# Patient Record
Sex: MALE | Race: WHITE | NOT HISPANIC OR LATINO | Employment: FULL TIME | URBAN - METROPOLITAN AREA
[De-identification: names, ages, dates, MRNs, and addresses within clinical notes are randomized per-mention and may not be internally consistent; named-entity substitution may affect disease eponyms.]

---

## 2020-04-14 ENCOUNTER — TELEPHONE (OUTPATIENT)
Dept: UROLOGY | Facility: CLINIC | Age: 45
End: 2020-04-14

## 2020-04-14 NOTE — TELEPHONE ENCOUNTER
----- Message from Gay Stacy sent at 4/14/2020 10:50 AM CDT -----  Contact: pt   Type:  RX Refill Request    Who Called: pt   Refill or New Rx:refill   RX Name and Strength:silvenafil 20 mg   How is the patient currently taking it? (ex. 1XDay):5 pills at 1 time once daily   Is this a 30 day or 90 day RX: 90 days  Preferred Pharmacy with phone number [facility]   Local or Mail Order: local   Ordering Provider:Kaycee  Would the patient rather a call back or a response via [facility]? Phone   Best Call Back Number: [phone]  Additional Information:

## 2020-04-16 ENCOUNTER — OFFICE VISIT (OUTPATIENT)
Dept: UROLOGY | Facility: CLINIC | Age: 45
End: 2020-04-16

## 2020-04-16 VITALS
HEIGHT: 74 IN | BODY MASS INDEX: 27.59 KG/M2 | SYSTOLIC BLOOD PRESSURE: 134 MMHG | HEART RATE: 62 BPM | RESPIRATION RATE: 18 BRPM | WEIGHT: 215 LBS | DIASTOLIC BLOOD PRESSURE: 84 MMHG

## 2020-04-16 DIAGNOSIS — N52.9 ERECTILE DYSFUNCTION, UNSPECIFIED ERECTILE DYSFUNCTION TYPE: Primary | ICD-10-CM

## 2020-04-16 PROCEDURE — 99213 OFFICE O/P EST LOW 20 MIN: CPT | Mod: S$GLB,,, | Performed by: SPECIALIST

## 2020-04-16 PROCEDURE — 99213 PR OFFICE/OUTPT VISIT, EST, LEVL III, 20-29 MIN: ICD-10-PCS | Mod: S$GLB,,, | Performed by: SPECIALIST

## 2020-04-16 RX ORDER — SILDENAFIL CITRATE 20 MG/1
TABLET ORAL
Qty: 50 TABLET | Refills: 5 | Status: SHIPPED | OUTPATIENT
Start: 2020-04-16 | End: 2020-07-08 | Stop reason: SDUPTHER

## 2020-04-16 NOTE — PROGRESS NOTES
Subjective:       Patient ID: Enzo Martinez is a 44 y.o. male.    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  Serum testosterone levels were checked in with normal.  He has minimal to no symptoms of libido lack of energy and fatigue.  Today with manage him on PD 5 inhibitors.  He uses sildenafil citrate and he works well for him.  His here today for an annual follow-up.    Patient tolerating medication well denies any obvious side effects from the medication.  But he reports that sometimes the medication will not work and other times he works really well.    Past Medical History: History reviewed. No pertinent past medical history.    Past Surgical Historical: History reviewed. No pertinent surgical history.     Medications:   Medication List with Changes/Refills   Current Medications    THYROID, PORK, 195 MG TAB    Take by mouth.        Past Social History:   Social History     Socioeconomic History    Marital status: Single     Spouse name: Not on file    Number of children: Not on file    Years of education: Not on file    Highest education level: Not on file   Occupational History    Not on file   Social Needs    Financial resource strain: Not on file    Food insecurity:     Worry: Not on file     Inability: Not on file    Transportation needs:     Medical: Not on file     Non-medical: Not on file   Tobacco Use    Smoking status: Never Smoker    Smokeless tobacco: Never Used   Substance and Sexual Activity    Alcohol use: Never     Frequency: Never    Drug use: Never    Sexual activity: Not on file   Lifestyle    Physical activity:     Days per week: Not on file     Minutes per session: Not on file    Stress: Not on file   Relationships    Social connections:     Talks on phone: Not on file     Gets together: Not on file     Attends Nondenominational service: Not on file     Active member of club or organization: Not on  file     Attends meetings of clubs or organizations: Not on file     Relationship status: Not on file   Other Topics Concern    Not on file   Social History Narrative    Not on file       Allergies: Review of patient's allergies indicates:  No Known Allergies     Family History: History reviewed. No pertinent family history.     Review of Systems:   systems reviewed and notable for organic impotence  All other systems were reviewed Neg except as stated in the HPI    Physical Exam:  General: A&Ox3. No apparent distress. No deformities.  Neck: No masses. Normal thyroid.  Lungs: normal inspiration. No use of accessory muscles.  Heart: normal pulse. No arrhythmias.  Abdomen: Soft. NT. ND. No masses. No hernias. No hepatosplenomegaly.  Lymphatic: Neck and groin nodes negative.  Skin: The skin is warm and dry. No jaundice.  Neurology: Cranial nerves 2-12 crossly intact, no focal weaknesses, no sensation deficits, no motor deficits  Ext: No clubbing, cyanosis or edema.  :  Deferred      Assessment/Plan:       44-year-old man with primary organic impotence here for follow-up visit.    1.  Refill his sildenafil citrate 20 mg.  He takes 5 of these tablets on an empty stomach as needed for sexual activity.  2.  Return to clinic in 6 months  3.  We also talked today about the possibility of doing generic Cialis.    Problem List Items Addressed This Visit     None      Visit Diagnoses     Erectile dysfunction, unspecified erectile dysfunction type    -  Primary

## 2020-07-08 DIAGNOSIS — N52.9 ERECTILE DYSFUNCTION, UNSPECIFIED ERECTILE DYSFUNCTION TYPE: Primary | ICD-10-CM

## 2020-07-08 RX ORDER — SILDENAFIL CITRATE 20 MG/1
TABLET ORAL
Qty: 50 TABLET | Refills: 5 | Status: SHIPPED | OUTPATIENT
Start: 2020-07-08 | End: 2020-10-21 | Stop reason: SDUPTHER

## 2020-10-21 ENCOUNTER — TELEPHONE (OUTPATIENT)
Dept: UROLOGY | Facility: CLINIC | Age: 45
End: 2020-10-21

## 2020-10-21 DIAGNOSIS — N52.9 ERECTILE DYSFUNCTION, UNSPECIFIED ERECTILE DYSFUNCTION TYPE: ICD-10-CM

## 2020-10-21 RX ORDER — SILDENAFIL CITRATE 20 MG/1
TABLET ORAL
Qty: 50 TABLET | Refills: 0 | Status: SHIPPED | OUTPATIENT
Start: 2020-10-21 | End: 2020-10-21 | Stop reason: SDUPTHER

## 2020-10-21 RX ORDER — SILDENAFIL CITRATE 20 MG/1
TABLET ORAL
Qty: 50 TABLET | Refills: 0 | Status: SHIPPED | OUTPATIENT
Start: 2020-10-21 | End: 2020-11-06 | Stop reason: SDUPTHER

## 2020-10-21 NOTE — TELEPHONE ENCOUNTER
----- Message from Susan Almaraz sent at 10/21/2020 11:12 AM CDT -----  Patient is returning an call from MEAGAN CHOWDARY letting her know he already has an appointment set up for 11/6 , please call back at  [phone].    Thanks,  Susan Almaraz

## 2020-10-21 NOTE — TELEPHONE ENCOUNTER
----- Message from Susan Almaraz sent at 10/21/2020 11:12 AM CDT -----  Patient is returning an call from MEAGAN CHOWDARY letting her know he already has an appointment set up for 11/6 , please call back at  [phone].    Thanks,  Susan Almaraz

## 2020-10-21 NOTE — TELEPHONE ENCOUNTER
Attempted to contact pt, no answer, LM for RTC to schedule appt. NB      ----- Message from Michael Higgins sent at 10/21/2020  9:57 AM CDT -----  Regarding: Refill  Type:  RX Refill Request    Who Called: Enzo  Refill or New Rx:Refill  RX Name and Strength:sildenafil 20mg  How is the patient currently taking it? (ex. 1XDay):prn  Is this a 30 day or 90 day RX:30  Preferred Pharmacy with phone number:  [id number] [address] [address]  Phone: [phone] Fax: [phone]  Local or Mail Order:Local  Ordering Provider:Dr. Benoit  Would the patient rather a call back or a response via [facility]? Call back  Best Call Back Number:[phone] (home)   Additional Information: na

## 2020-11-06 ENCOUNTER — OFFICE VISIT (OUTPATIENT)
Dept: UROLOGY | Facility: CLINIC | Age: 45
End: 2020-11-06

## 2020-11-06 VITALS
DIASTOLIC BLOOD PRESSURE: 83 MMHG | SYSTOLIC BLOOD PRESSURE: 137 MMHG | HEIGHT: 74 IN | HEART RATE: 67 BPM | WEIGHT: 205 LBS | BODY MASS INDEX: 26.31 KG/M2

## 2020-11-06 DIAGNOSIS — N52.9 ERECTILE DYSFUNCTION, UNSPECIFIED ERECTILE DYSFUNCTION TYPE: ICD-10-CM

## 2020-11-06 PROCEDURE — 99213 PR OFFICE/OUTPT VISIT, EST, LEVL III, 20-29 MIN: ICD-10-PCS | Mod: S$GLB,,, | Performed by: SPECIALIST

## 2020-11-06 PROCEDURE — 99213 OFFICE O/P EST LOW 20 MIN: CPT | Mod: S$GLB,,, | Performed by: SPECIALIST

## 2020-11-06 RX ORDER — SILDENAFIL CITRATE 20 MG/1
TABLET ORAL
Qty: 50 TABLET | Refills: 5 | Status: SHIPPED | OUTPATIENT
Start: 2020-11-06 | End: 2021-05-04

## 2020-11-06 NOTE — PROGRESS NOTES
Patient seen and examined.  Clinical data reviewed.  Case discussed with the nurse practitioner.  I agree with her assessment and plan.    Briefly 45-year-old man with primary organic impotence being managed with sildenafil citrate.  His responding well to therapy.  Plan today will be to renew his prescription have patient return to clinic in 1 year

## 2020-11-06 NOTE — PROGRESS NOTES
Chief Complaint:   Chief Complaint   Patient presents with    Erectile Dysfunction       HPI:  45-year-old  male known to the service of Dr. Benoit who presents for six-month follow-up erectile dysfunction.  He has primary organic impotence. Serum testosterone levels were checked and were within normal limits.  He has minimal to no symptoms decreased libido, lack of energy, or fatigue.  We managed him with PDE 5 inhibitors, uses sildenafil citrate which works well for him.  He denies any side effects from the medication.  He requests a refill today.    Allergies:  Patient has no known allergies.    Medications:  has a current medication list which includes the following prescription(s): sildenafil and thyroid (pork).    Review of Systems:  General: No fever, chills, vision changes, dizziness, weakness, fatigue, unexplained weight loss, confusion, or mood swings.  Skin: No rashes, itching, or changes in color/texture of skin.  Chest: Denies OLIVAREZ, cyanosis, wheezing, cough, and sputum production.  Abdomen: Appetite fine. Denies diarrhea, abdominal pain, hematemesis, or blood in stool.  Musculoskeletal: No joint stiffness or swelling. No painful lymph nodes.  : reviewed and negative except as stated above in the HPI.  All other review of systems negative.    PMH:   has a past medical history of Erectile dysfunction.    PSH:   has no past surgical history on file.    FamHx: family history is not on file.    SocHx:  reports that he has never smoked. He has never used smokeless tobacco. He reports that he does not drink alcohol or use drugs.      Physical Exam:  Vitals:    11/06/20 1301   BP: 137/83   Pulse: 67     General: AAOx3, no apparent distress, no deformities  Neck: supple, no masses, normal thyroid, full ROM  Lungs: CTAB, no adventitious breath sounds, normal inspiration, no use of accessory muscles  Heart: regular rate and rhythm, no arrhythmias  Abdomen: soft, NT, ND, no masses, no hernias, no  hepatosplenomegaly  Lymphatic: no unusually enlarged or tender lymph nodes  Skin: warm and dry, no jaundice, no rash  Ext: without edema or deformity, PERDOMO, ambulates independently  : deferred    Labs/Studies: none    Impression/Plan:   Erectile dysfunction, unspecified erectile dysfunction type  Comments:  stable, maintained on sildenafil citrate- refill sent, no urinary complaints today, f/u one year & PRN  Orders:  -     sildenafil (REVATIO) 20 mg Tab; Take as instructed anywhere from 2-5 pills on an empty stomach 1 hr prior to sexual activity as needed for sex.  Dispense: 50 tablet; Refill: 5        Follow up in about 1 year (around 11/6/2021) for annual follow up ED.

## 2021-05-04 ENCOUNTER — TELEPHONE (OUTPATIENT)
Dept: UROLOGY | Facility: CLINIC | Age: 46
End: 2021-05-04

## 2021-05-17 ENCOUNTER — OFFICE VISIT (OUTPATIENT)
Dept: UROLOGY | Facility: CLINIC | Age: 46
End: 2021-05-17

## 2021-05-17 VITALS
RESPIRATION RATE: 18 BRPM | SYSTOLIC BLOOD PRESSURE: 143 MMHG | HEART RATE: 68 BPM | HEIGHT: 74 IN | DIASTOLIC BLOOD PRESSURE: 90 MMHG | WEIGHT: 220 LBS | BODY MASS INDEX: 28.23 KG/M2

## 2021-05-17 DIAGNOSIS — N52.9 ERECTILE DYSFUNCTION, UNSPECIFIED ERECTILE DYSFUNCTION TYPE: Primary | ICD-10-CM

## 2021-05-17 DIAGNOSIS — N52.9 ERECTILE DYSFUNCTION, UNSPECIFIED ERECTILE DYSFUNCTION TYPE: ICD-10-CM

## 2021-05-17 PROCEDURE — 99213 PR OFFICE/OUTPT VISIT, EST, LEVL III, 20-29 MIN: ICD-10-PCS | Mod: S$GLB,,, | Performed by: SPECIALIST

## 2021-05-17 PROCEDURE — 99213 OFFICE O/P EST LOW 20 MIN: CPT | Mod: S$GLB,,, | Performed by: SPECIALIST

## 2021-05-17 RX ORDER — SILDENAFIL CITRATE 20 MG/1
TABLET ORAL
Qty: 90 TABLET | Refills: 3 | Status: SHIPPED | OUTPATIENT
Start: 2021-05-17 | End: 2021-07-06

## 2021-05-19 DIAGNOSIS — N52.9 ERECTILE DYSFUNCTION, UNSPECIFIED ERECTILE DYSFUNCTION TYPE: ICD-10-CM

## 2021-05-19 RX ORDER — SILDENAFIL CITRATE 20 MG/1
TABLET ORAL
Qty: 90 TABLET | Refills: 3 | OUTPATIENT
Start: 2021-05-19

## 2021-05-21 ENCOUNTER — TELEPHONE (OUTPATIENT)
Dept: UROLOGY | Facility: CLINIC | Age: 46
End: 2021-05-21

## 2021-07-19 DIAGNOSIS — N52.9 ERECTILE DYSFUNCTION, UNSPECIFIED ERECTILE DYSFUNCTION TYPE: ICD-10-CM

## 2021-07-19 RX ORDER — SILDENAFIL CITRATE 20 MG/1
TABLET ORAL
Qty: 90 TABLET | Refills: 2 | Status: SHIPPED | OUTPATIENT
Start: 2021-07-19 | End: 2021-09-03

## 2021-12-21 ENCOUNTER — TELEPHONE (OUTPATIENT)
Dept: UROLOGY | Facility: CLINIC | Age: 46
End: 2021-12-21

## 2021-12-21 DIAGNOSIS — N52.9 ERECTILE DYSFUNCTION, UNSPECIFIED ERECTILE DYSFUNCTION TYPE: ICD-10-CM

## 2021-12-21 RX ORDER — SILDENAFIL 100 MG/1
100 TABLET, FILM COATED ORAL DAILY PRN
Qty: 30 TABLET | Refills: 2 | Status: SHIPPED | OUTPATIENT
Start: 2021-12-21 | End: 2022-03-04

## 2022-02-26 DIAGNOSIS — N52.9 ERECTILE DYSFUNCTION, UNSPECIFIED ERECTILE DYSFUNCTION TYPE: Primary | ICD-10-CM

## 2022-03-01 DIAGNOSIS — N52.9 ERECTILE DYSFUNCTION, UNSPECIFIED ERECTILE DYSFUNCTION TYPE: Primary | ICD-10-CM

## 2022-03-01 NOTE — TELEPHONE ENCOUNTER
----- Message from Nisreen Elder sent at 3/1/2022 12:47 PM CST -----  Contact: PT  .Type:  RX Refill Request    Who Called:  Enzo   Refill or New Rx: refill   RX Name and Strength: sildenafiL (VIAGRA) 100 MG tablet    How is the patient currently taking it? (ex. 1XDay):   Is this a 30 day or 90 day RX:   Preferred Pharmacy with phone number: .       [id number] - [place], [place] - [address]  [address]  [place] [address]  Phone: [phone] Fax: [phone]       Local or Mail Order: local   Ordering Provider: José Luis    Would the patient rather a call back or a response via [facility]?  Callback   Best Call Back Number: .[phone] (home)      Additional Information:

## 2022-03-03 RX ORDER — SILDENAFIL 100 MG/1
100 TABLET, FILM COATED ORAL DAILY PRN
Qty: 30 TABLET | Refills: 11 | Status: SHIPPED | OUTPATIENT
Start: 2022-03-03 | End: 2023-01-19

## 2022-03-04 RX ORDER — SILDENAFIL 100 MG/1
TABLET, FILM COATED ORAL
Qty: 30 TABLET | Refills: 3 | Status: SHIPPED | OUTPATIENT
Start: 2022-03-04 | End: 2023-01-19

## 2022-06-30 ENCOUNTER — TELEPHONE (OUTPATIENT)
Dept: UROLOGY | Facility: CLINIC | Age: 47
End: 2022-06-30

## 2022-06-30 NOTE — TELEPHONE ENCOUNTER
Called to try and r/s appt with ehr on 7/5/22 due to scheduling conflict. No answer/no voicemail. lpn

## 2022-07-05 ENCOUNTER — OFFICE VISIT (OUTPATIENT)
Dept: UROLOGY | Facility: CLINIC | Age: 47
End: 2022-07-05

## 2022-07-05 VITALS
DIASTOLIC BLOOD PRESSURE: 72 MMHG | WEIGHT: 210 LBS | HEIGHT: 74 IN | BODY MASS INDEX: 26.95 KG/M2 | HEART RATE: 71 BPM | SYSTOLIC BLOOD PRESSURE: 140 MMHG

## 2022-07-05 DIAGNOSIS — N52.9 ERECTILE DYSFUNCTION, UNSPECIFIED ERECTILE DYSFUNCTION TYPE: Primary | ICD-10-CM

## 2022-07-05 PROCEDURE — 99214 PR OFFICE/OUTPT VISIT, EST, LEVL IV, 30-39 MIN: ICD-10-PCS | Mod: S$GLB,,, | Performed by: UROLOGY

## 2022-07-05 PROCEDURE — 99214 OFFICE O/P EST MOD 30 MIN: CPT | Mod: S$GLB,,, | Performed by: UROLOGY

## 2022-07-05 NOTE — PROGRESS NOTES
Subjective:       Patient ID: Enzo Martinez is a 46 y.o. male.    Chief Complaint: Annual Exam      HPI:  46-year-old male with erectile dysfunction managed with sildenafil he is here for his yearly appointment with refills    Past Medical History:   Past Medical History:   Diagnosis Date    Erectile dysfunction        Past Surgical Historical: History reviewed. No pertinent surgical history.     Medications:   Medication List with Changes/Refills   Current Medications    SILDENAFIL (VIAGRA) 100 MG TABLET    TAKE ONE TABLET BY MOUTH DAILY AS NEEDED FOR ERECTILE DYSFUNCTION    SILDENAFIL (VIAGRA) 100 MG TABLET    Take 1 tablet (100 mg total) by mouth daily as needed for Erectile Dysfunction.    THYROID, PORK, 195 MG TAB    Take by mouth.        Past Social History:   Social History     Socioeconomic History    Marital status: Single   Tobacco Use    Smoking status: Never Smoker    Smokeless tobacco: Never Used   Substance and Sexual Activity    Alcohol use: Never    Drug use: Never       Allergies: Review of patient's allergies indicates:  No Known Allergies     Family History: History reviewed. No pertinent family history.     Review of Systems:  Review of Systems   Constitutional: Negative for activity change and appetite change.   HENT: Negative for congestion and dental problem.    Eyes: Negative for visual disturbance.   Respiratory: Negative for chest tightness and shortness of breath.    Cardiovascular: Negative for chest pain.   Gastrointestinal: Negative for abdominal distention and abdominal pain.   Genitourinary: Negative for decreased urine volume, difficulty urinating, dysuria, enuresis, flank pain, frequency, genital sores, hematuria, penile discharge, penile pain, penile swelling, scrotal swelling, testicular pain and urgency.   Musculoskeletal: Negative for back pain and neck pain.   Skin: Negative for color change.   Neurological: Negative for dizziness.   Hematological: Negative for  adenopathy.   Psychiatric/Behavioral: Negative for agitation, behavioral problems and confusion.       Physical Exam:  Physical Exam  Constitutional:       General: He is not in acute distress.     Appearance: He is well-developed.   HENT:      Head: Normocephalic and atraumatic.      Nose: Nose normal.   Eyes:      General: No scleral icterus.     Conjunctiva/sclera: Conjunctivae normal.      Pupils: Pupils are equal, round, and reactive to light.   Neck:      Thyroid: No thyromegaly.      Trachea: No tracheal deviation.   Cardiovascular:      Rate and Rhythm: Normal rate and regular rhythm.      Heart sounds: Normal heart sounds.   Pulmonary:      Effort: Pulmonary effort is normal. No respiratory distress.      Breath sounds: Normal breath sounds. No wheezing or rales.   Abdominal:      General: Bowel sounds are normal. There is no distension.      Palpations: Abdomen is soft.      Tenderness: There is no abdominal tenderness. There is no guarding or rebound.   Genitourinary:     Penis: Normal. No tenderness.       Prostate: Normal.   Musculoskeletal:         General: No deformity. Normal range of motion.      Cervical back: Neck supple.   Lymphadenopathy:      Cervical: No cervical adenopathy.   Skin:     General: Skin is warm and dry.      Findings: No erythema or rash.   Neurological:      Mental Status: He is alert and oriented to person, place, and time.      Cranial Nerves: No cranial nerve deficit.   Psychiatric:         Behavior: Behavior normal.         Assessment/Plan:       Problem List Items Addressed This Visit    None     Visit Diagnoses     Erectile dysfunction, unspecified erectile dysfunction type    -  Primary             Erectile dysfunction:  We will proceed with prescription of sildenafil

## 2023-01-18 DIAGNOSIS — N52.9 ERECTILE DYSFUNCTION, UNSPECIFIED ERECTILE DYSFUNCTION TYPE: Primary | ICD-10-CM

## 2023-01-19 RX ORDER — SILDENAFIL 100 MG/1
100 TABLET, FILM COATED ORAL DAILY PRN
Qty: 30 TABLET | Refills: 11 | Status: SHIPPED | OUTPATIENT
Start: 2023-01-19 | End: 2024-01-04

## 2024-01-04 DIAGNOSIS — N52.9 ERECTILE DYSFUNCTION, UNSPECIFIED ERECTILE DYSFUNCTION TYPE: ICD-10-CM

## 2024-01-04 RX ORDER — SILDENAFIL 100 MG/1
100 TABLET, FILM COATED ORAL DAILY PRN
Qty: 90 TABLET | Refills: 1 | Status: SHIPPED | OUTPATIENT
Start: 2024-01-04

## 2024-06-15 DIAGNOSIS — N52.9 ERECTILE DYSFUNCTION, UNSPECIFIED ERECTILE DYSFUNCTION TYPE: ICD-10-CM

## 2024-06-17 ENCOUNTER — TELEPHONE (OUTPATIENT)
Dept: UROLOGY | Facility: CLINIC | Age: 49
End: 2024-06-17

## 2024-06-17 RX ORDER — SILDENAFIL 100 MG/1
100 TABLET, FILM COATED ORAL DAILY PRN
Qty: 90 TABLET | Refills: 1 | OUTPATIENT
Start: 2024-06-17

## 2024-06-17 NOTE — TELEPHONE ENCOUNTER
Pt requesting refill on viagra. Pt has not been seen in almost two years. Informed him he needs an mimi. Pt scheduled.    ----- Message from Alexandria Durbin sent at 6/17/2024  1:38 PM CDT -----  Contact: self  Type:  RX Refill Request    Who Called: Enzo Martinez  Refill or New Rx:refill  RX Name and Strength:sildenafiL (VIAGRA) 100 MG tablet  How is the patient currently taking it? (ex. 1XDay):1x  Is this a 30 day or 90 day RX:90  Preferred Pharmacy with phone number:  [facility] [id number] - [place], [place] - [address]  [address] [facility] [address]  Phone: [phone] Fax: [phone]    Local or Mail Order:local  Ordering Provider:José Luis  Would the patient rather a call back or a response via [facility]? Call back  Best Call Back Number:[phone]  Additional Information: n/a

## 2024-06-18 ENCOUNTER — OFFICE VISIT (OUTPATIENT)
Dept: UROLOGY | Facility: CLINIC | Age: 49
End: 2024-06-18

## 2024-06-18 VITALS
OXYGEN SATURATION: 96 % | HEART RATE: 76 BPM | HEIGHT: 74 IN | DIASTOLIC BLOOD PRESSURE: 96 MMHG | SYSTOLIC BLOOD PRESSURE: 160 MMHG | WEIGHT: 230 LBS | BODY MASS INDEX: 29.52 KG/M2

## 2024-06-18 DIAGNOSIS — N52.1 ERECTILE DYSFUNCTION DUE TO DISEASES CLASSIFIED ELSEWHERE: Primary | ICD-10-CM

## 2024-06-18 DIAGNOSIS — N52.9 ERECTILE DYSFUNCTION, UNSPECIFIED ERECTILE DYSFUNCTION TYPE: ICD-10-CM

## 2024-06-18 PROCEDURE — 99213 OFFICE O/P EST LOW 20 MIN: CPT | Mod: S$GLB,,,

## 2024-06-18 RX ORDER — SILDENAFIL 100 MG/1
100 TABLET, FILM COATED ORAL DAILY PRN
Qty: 90 TABLET | Refills: 1 | Status: SHIPPED | OUTPATIENT
Start: 2024-06-18 | End: 2025-06-18

## 2024-06-18 NOTE — PROGRESS NOTES
Subjective:       Patient ID: Enzo Martinez is a 48 y.o. male.    Chief Complaint: No chief complaint on file.      HPI:  46-year-old male with erectile dysfunction managed with sildenafil he is here for his yearly appointment with refills    Past Medical History:   Past Medical History:   Diagnosis Date    Erectile dysfunction        Past Surgical Historical: History reviewed. No pertinent surgical history.     Medications:   Medication List with Changes/Refills   Current Medications    SILDENAFIL (VIAGRA) 100 MG TABLET    TAKE ONE TABLET BY MOUTH DAILY AS NEEDED FOR ERECTILE DYSFUNCTION    THYROID, PORK, 195 MG TAB    Take by mouth.        Past Social History:   Social History     Socioeconomic History    Marital status: Single   Tobacco Use    Smoking status: Never    Smokeless tobacco: Never   Substance and Sexual Activity    Alcohol use: Never    Drug use: Never       Allergies: Review of patient's allergies indicates:  No Known Allergies     Family History: No family history on file.     Review of Systems:  Review of Systems   Constitutional: Negative.    HENT: Negative.     Eyes: Negative.    Respiratory: Negative.     Cardiovascular: Negative.    Gastrointestinal: Negative.    Endocrine: Negative.    Genitourinary: Negative.    Musculoskeletal: Negative.    Skin: Negative.    Allergic/Immunologic: Negative.    Neurological: Negative.    Hematological: Negative.    Psychiatric/Behavioral: Negative.         Physical Exam:  Physical Exam  Constitutional:       Appearance: Normal appearance.   Cardiovascular:      Rate and Rhythm: Normal rate.   Pulmonary:      Effort: Pulmonary effort is normal.   Abdominal:      General: Bowel sounds are normal.      Palpations: Abdomen is soft.   Genitourinary:     Prostate: Normal.   Neurological:      Mental Status: He is alert and oriented to person, place, and time.       Assessment/Plan:     Erectile dysfunction: Patient is doing well on sildenafil 100 mg as needed.  We  will send a refill for this medication and have him follow up in 1 year or sooner as needed  Problem List Items Addressed This Visit    None  Visit Diagnoses       Erectile dysfunction due to diseases classified elsewhere    -  Primary

## 2024-11-26 DIAGNOSIS — N52.9 ERECTILE DYSFUNCTION, UNSPECIFIED ERECTILE DYSFUNCTION TYPE: ICD-10-CM

## 2024-11-26 RX ORDER — SILDENAFIL 100 MG/1
100 TABLET, FILM COATED ORAL DAILY PRN
Qty: 90 TABLET | Refills: 1 | Status: SHIPPED | OUTPATIENT
Start: 2024-11-26 | End: 2025-11-26

## 2025-05-08 DIAGNOSIS — N52.9 ERECTILE DYSFUNCTION, UNSPECIFIED ERECTILE DYSFUNCTION TYPE: ICD-10-CM

## 2025-05-08 RX ORDER — SILDENAFIL 100 MG/1
100 TABLET, FILM COATED ORAL DAILY PRN
Qty: 90 TABLET | Refills: 1 | Status: SHIPPED | OUTPATIENT
Start: 2025-05-08 | End: 2026-05-08

## 2025-06-17 ENCOUNTER — OFFICE VISIT (OUTPATIENT)
Dept: UROLOGY | Facility: CLINIC | Age: 50
End: 2025-06-17

## 2025-06-17 VITALS
SYSTOLIC BLOOD PRESSURE: 139 MMHG | DIASTOLIC BLOOD PRESSURE: 73 MMHG | WEIGHT: 220 LBS | HEART RATE: 75 BPM | BODY MASS INDEX: 28.23 KG/M2 | HEIGHT: 74 IN

## 2025-06-17 DIAGNOSIS — N52.9 ERECTILE DYSFUNCTION, UNSPECIFIED ERECTILE DYSFUNCTION TYPE: Primary | ICD-10-CM

## 2025-06-17 PROCEDURE — 99213 OFFICE O/P EST LOW 20 MIN: CPT | Mod: S$PBB,,, | Performed by: UROLOGY

## 2025-06-17 RX ORDER — LEVOTHYROXINE, LIOTHYRONINE 76; 18 UG/1; UG/1
240 TABLET ORAL
COMMUNITY
Start: 2025-05-02

## 2025-06-17 RX ORDER — TADALAFIL 20 MG/1
TABLET ORAL
COMMUNITY
Start: 2025-04-11

## 2025-06-17 RX ORDER — TESTOSTERONE CYPIONATE 200 MG/ML
INJECTION, SOLUTION INTRAMUSCULAR
COMMUNITY
Start: 2025-04-30

## 2025-06-17 RX ORDER — ALPRAZOLAM 0.5 MG/1
0.5 TABLET ORAL 2 TIMES DAILY
COMMUNITY
Start: 2025-02-04

## 2025-06-17 NOTE — PROGRESS NOTES
Subjective:       Patient ID: Enzo Martinez is a 49 y.o. male.    Chief Complaint: No chief complaint on file.      HPI:  49-year-old male with erectile dysfunction managed with sildenafil he is here for his yearly appointment with refills    Past Medical History:   Past Medical History:   Diagnosis Date    Erectile dysfunction        Past Surgical Historical: History reviewed. No pertinent surgical history.     Medications:   Medication List with Changes/Refills   Current Medications    ALPRAZOLAM (XANAX) 0.5 MG TABLET    Take 0.5 mg by mouth 2 (two) times daily.    NP THYROID 120 MG TAB    Take 240 mg by mouth.    SILDENAFIL (VIAGRA) 100 MG TABLET    Take 1 tablet (100 mg total) by mouth daily as needed for Erectile Dysfunction.    TADALAFIL (CIALIS) 20 MG TAB    Take 1 tablet by mouth as needed 30 minutes prior to intercourse    TESTOSTERONE CYPIONATE (DEPOTESTOTERONE CYPIONATE) 200 MG/ML INJECTION    Inject 3/4 ml intramuscularly twice a week   Discontinued Medications    THYROID, PORK, 195 MG TAB    Take by mouth.        Past Social History:   Social History     Socioeconomic History    Marital status: Single   Tobacco Use    Smoking status: Never    Smokeless tobacco: Never   Substance and Sexual Activity    Alcohol use: Never    Drug use: Never       Allergies: Review of patient's allergies indicates:  No Known Allergies     Family History: No family history on file.     Review of Systems:  Review of Systems   Constitutional: Negative.    HENT: Negative.     Eyes: Negative.    Respiratory: Negative.     Cardiovascular: Negative.    Gastrointestinal: Negative.    Endocrine: Negative.    Genitourinary: Negative.    Musculoskeletal: Negative.    Skin: Negative.    Allergic/Immunologic: Negative.    Neurological: Negative.    Hematological: Negative.    Psychiatric/Behavioral: Negative.         Physical Exam:  Physical Exam  Constitutional:       Appearance: Normal appearance.   Cardiovascular:      Rate and  Rhythm: Normal rate.   Pulmonary:      Effort: Pulmonary effort is normal.   Abdominal:      General: Bowel sounds are normal.      Palpations: Abdomen is soft.   Genitourinary:     Prostate: Normal.   Neurological:      Mental Status: He is alert and oriented to person, place, and time.         Assessment/Plan:     Erectile dysfunction: Patient is doing well on sildenafil 100 mg as needed.  We will send a refill for this medication and have him follow up in 1 year or sooner as needed  Problem List Items Addressed This Visit    None